# Patient Record
Sex: FEMALE | Race: WHITE | URBAN - METROPOLITAN AREA
[De-identification: names, ages, dates, MRNs, and addresses within clinical notes are randomized per-mention and may not be internally consistent; named-entity substitution may affect disease eponyms.]

---

## 2023-07-18 ENCOUNTER — OFFICE VISIT (OUTPATIENT)
Dept: INTERNAL MEDICINE CLINIC | Facility: CLINIC | Age: 38
End: 2023-07-18
Payer: COMMERCIAL

## 2023-07-18 VITALS
RESPIRATION RATE: 17 BRPM | TEMPERATURE: 99 F | DIASTOLIC BLOOD PRESSURE: 60 MMHG | HEART RATE: 103 BPM | BODY MASS INDEX: 25.64 KG/M2 | SYSTOLIC BLOOD PRESSURE: 110 MMHG | HEIGHT: 58.27 IN | WEIGHT: 123.81 LBS | OXYGEN SATURATION: 98 %

## 2023-07-18 DIAGNOSIS — N63.0 BREAST NODULE: ICD-10-CM

## 2023-07-18 DIAGNOSIS — Z12.4 CERVICAL CANCER SCREENING: ICD-10-CM

## 2023-07-18 DIAGNOSIS — Z00.00 ANNUAL PHYSICAL EXAM: Primary | ICD-10-CM

## 2023-07-18 DIAGNOSIS — Z30.09 COUNSELING FOR INITIATION OF BIRTH CONTROL METHOD: ICD-10-CM

## 2023-07-18 LAB
CONTROL LINE PRESENT WITH A CLEAR BACKGROUND (YES/NO): YES YES/NO
KIT LOT #: NORMAL NUMERIC
PREGNANCY TEST, URINE: NEGATIVE

## 2023-07-18 PROCEDURE — 99385 PREV VISIT NEW AGE 18-39: CPT | Performed by: INTERNAL MEDICINE

## 2023-07-18 PROCEDURE — 3008F BODY MASS INDEX DOCD: CPT | Performed by: INTERNAL MEDICINE

## 2023-07-18 PROCEDURE — 3078F DIAST BP <80 MM HG: CPT | Performed by: INTERNAL MEDICINE

## 2023-07-18 PROCEDURE — 81025 URINE PREGNANCY TEST: CPT | Performed by: INTERNAL MEDICINE

## 2023-07-18 PROCEDURE — 3074F SYST BP LT 130 MM HG: CPT | Performed by: INTERNAL MEDICINE

## 2023-07-18 RX ORDER — NORETHINDRONE ACETATE AND ETHINYL ESTRADIOL 1MG-20(21)
1 KIT ORAL DAILY
Qty: 90 TABLET | Refills: 3 | Status: SHIPPED | OUTPATIENT
Start: 2023-07-18 | End: 2024-07-17

## 2023-08-03 LAB — HPV I/H RISK 1 DNA SPEC QL NAA+PROBE: NEGATIVE

## 2023-09-22 DIAGNOSIS — Z30.09 COUNSELING FOR INITIATION OF BIRTH CONTROL METHOD: ICD-10-CM

## 2023-09-25 RX ORDER — NORETHINDRONE ACETATE AND ETHINYL ESTRADIOL 1MG-20(21)
1 KIT ORAL DAILY
Qty: 90 TABLET | Refills: 3 | Status: SHIPPED | OUTPATIENT
Start: 2023-09-25 | End: 2024-09-24
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
## 2024-01-04 ENCOUNTER — HOSPITAL ENCOUNTER (OUTPATIENT)
Age: 39
Discharge: HOME | End: 2024-01-04
Payer: COMMERCIAL

## 2024-01-04 VITALS
WEIGHT: 124 LBS | DIASTOLIC BLOOD PRESSURE: 82 MMHG | HEIGHT: 58.27 IN | HEART RATE: 95 BPM | RESPIRATION RATE: 20 BRPM | SYSTOLIC BLOOD PRESSURE: 128 MMHG | BODY MASS INDEX: 25.68 KG/M2 | OXYGEN SATURATION: 100 % | TEMPERATURE: 99 F

## 2024-01-04 DIAGNOSIS — J11.1 INFLUENZA: Primary | ICD-10-CM

## 2024-01-04 LAB
POCT INFLUENZA A: POSITIVE
POCT INFLUENZA B: NEGATIVE
S PYO AG THROAT QL IA.RAPID: NEGATIVE
SARS-COV-2 RNA RESP QL NAA+PROBE: NOT DETECTED

## 2024-01-04 PROCEDURE — 99213 OFFICE O/P EST LOW 20 MIN: CPT

## 2024-01-04 PROCEDURE — 87502 INFLUENZA DNA AMP PROBE: CPT | Performed by: EMERGENCY MEDICINE

## 2024-01-04 PROCEDURE — 99204 OFFICE O/P NEW MOD 45 MIN: CPT

## 2024-01-04 PROCEDURE — 87651 STREP A DNA AMP PROBE: CPT | Performed by: PHYSICIAN ASSISTANT

## 2024-01-04 RX ORDER — BENZONATATE 100 MG/1
100 CAPSULE ORAL 3 TIMES DAILY PRN
Qty: 20 CAPSULE | Refills: 0 | Status: SHIPPED | OUTPATIENT
Start: 2024-01-04 | End: 2024-02-03

## 2024-01-04 NOTE — ED INITIAL ASSESSMENT (HPI)
Sore throat, body aches, cough x3 days.     Language line used for translation.  Kushal ID#[id number]

## 2024-01-04 NOTE — ED PROVIDER NOTES
Patient Seen in: [facility]      History     Chief Complaint   Patient presents with    Sore Throat    Cough/URI     Stated Complaint: sore throat    Subjective:   The history is provided by the patient.       38-year-old female with no past medical history presents to the urgent care due to sore throat for the past 3 days.  Associated subjective fevers body aches and chills.  Mild nasal congestion.  Dry cough- No chest pain or shortness of breath.  Patient's been taking ibuprofen over-the-counter with some relief.  No known sick contacts.    Objective:   Past Medical History:   Diagnosis Date    Allergic rhinitis     Rinitis alergica              History reviewed. No pertinent surgical history.             Social History     Socioeconomic History    Marital status: Single   Tobacco Use    Smoking status: Never    Smokeless tobacco: Never   Vaping Use    Vaping Use: Never used   Substance and Sexual Activity    Alcohol use: Yes     Comment: Ocasionally    Drug use: Never   Other Topics Concern    Caffeine Concern No    Exercise No    Seat Belt No    Special Diet No    Stress Concern No    Weight Concern No              Review of Systems   Constitutional:  Positive for chills, fatigue and fever.   HENT:  Positive for congestion and sore throat. Negative for tinnitus, trouble swallowing and voice change.    Respiratory:  Positive for cough. Negative for shortness of breath, wheezing and stridor.    Cardiovascular: Negative.    Gastrointestinal: Negative.        Positive for stated complaint: sore throat  Other systems are as noted in HPI.  Constitutional and vital signs reviewed.      All other systems reviewed and negative except as noted above.    Physical Exam     ED Triage Vitals   BP 01/04/24 1431 128/82   Pulse 01/04/24 1431 95   Resp 01/04/24 1431 20   Temp 01/04/24 1430 99.3 °F (37.4 °C)   Temp src 01/04/24 1430 Temporal   SpO2 01/04/24 1431 100 %   O2 Device 01/04/24 1431 None (Room air)        Current:/82   Pulse 95   Temp 99.3 °F (37.4 °C) (Temporal)   Resp 20   Ht 148 cm (4' 10.27\")   Wt 56.2 kg   LMP 01/02/2024   SpO2 100%   BMI 25.68 kg/m²         Physical Exam  Vitals and nursing note reviewed.   Constitutional:       General: She is not in acute distress.     Appearance: She is well-developed. She is not toxic-appearing.   HENT:      Head: Normocephalic.      Right Ear: Tympanic membrane and ear canal normal.      Left Ear: Tympanic membrane and ear canal normal.      Nose: Congestion present.      Mouth/Throat:      Mouth: Mucous membranes are moist.      Pharynx: Posterior oropharyngeal erythema present. No pharyngeal swelling, oropharyngeal exudate or uvula swelling.      Tonsils: No tonsillar exudate or tonsillar abscesses. 0 on the right. 0 on the left.   Eyes:      Conjunctiva/sclera: Conjunctivae normal.   Cardiovascular:      Rate and Rhythm: Normal rate and regular rhythm.   Pulmonary:      Effort: Pulmonary effort is normal.      Breath sounds: Normal breath sounds.   Musculoskeletal:      Cervical back: Normal range of motion.   Lymphadenopathy:      Cervical: Cervical adenopathy present.   Skin:     General: Skin is warm.   Neurological:      General: No focal deficit present.      Mental Status: She is alert and oriented to person, place, and time.   Psychiatric:         Mood and Affect: Mood normal.         Behavior: Behavior normal.               ED Course     Labs Reviewed   POCT FLU TEST - Abnormal; Notable for the following components:       Result Value    POCT INFLUENZA A Positive (*)     All other components within normal limits    Narrative:     This assay is a rapid molecular in vitro test utilizing nucleic acid amplification of influenza A and B viral RNA.   RAPID SARS-COV-2 BY PCR - Normal   RAPID STREP A - Normal                      MDM     38-year-old female presents to the urgent care due to subjective fevers, body aches, chills, sore throat dry cough  for the past 3 days.  No trismus drooling muffled voice chest pain or shortness of breath.  Denies any abdominal pain nausea or vomiting.  Taking ibuprofen at home with relief.  No known sick contacts.      On exam the patient is afebrile nontoxic.  Copious nasal congestion present.  Mild posterior pharynx erythema.  No concern for peritonsillar abscess.  Lungs clear to auscultation.  Influenza positive COVID rapid strep are negative.  Discussed these findings with the patient.  Continue conservative treatment ibuprofen Mucinex D.  Will rx Tessalon Perles for cough.  Discussed with patient home care strict return precautions importance close follow-up.                           Medical Decision Making  Problems Addressed:  Influenza: acute illness or injury    Amount and/or Complexity of Data Reviewed  Labs: ordered. Decision-making details documented in ED Course.    Risk  OTC drugs.  Prescription drug management.        Disposition and Plan     Clinical Impression:  1. Influenza         Disposition:  Discharge  1/4/2024  3:19 pm    Follow-up:  Tammy Ivy MD  [address] [address]  [phone]                Medications Prescribed:  Discharge Medication List as of 1/4/2024  3:21 PM        START taking these medications    Details   benzonatate 100 MG Oral Cap Take 1 capsule (100 mg total) by mouth 3 (three) times daily as needed for cough., Normal, Disp-20 capsule, R-0

## 2024-01-04 NOTE — DISCHARGE INSTRUCTIONS
Increase fluid and rest  may use Mucinex D as needed  May use Tessalon Perles as needed for cough  ibuProfen every 6 hours as needed for pain  Follow-up with primary care doctor in 48 hours for recheck  reTurn to ER symptoms worsen

## 2024-08-25 DIAGNOSIS — Z30.09 COUNSELING FOR INITIATION OF BIRTH CONTROL METHOD: ICD-10-CM

## 2024-08-26 RX ORDER — NORETHINDRONE ACETATE AND ETHINYL ESTRADIOL 1MG-20(21)
1 KIT ORAL DAILY
Qty: 84 TABLET | Refills: 0 | Status: SHIPPED | OUTPATIENT
Start: 2024-08-26
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
## 2024-11-15 DIAGNOSIS — Z30.09 COUNSELING FOR INITIATION OF BIRTH CONTROL METHOD: ICD-10-CM

## 2024-11-15 RX ORDER — NORETHINDRONE ACETATE AND ETHINYL ESTRADIOL AND FERROUS FUMARATE 1MG-20(21)
KIT ORAL
Qty: 84 TABLET | Refills: 0 | Status: SHIPPED | OUTPATIENT
Start: 2024-11-15

## 2024-11-15 NOTE — TELEPHONE ENCOUNTER
Protocol FAIL    Requesting: Norethin Ace-Eth Estrad-FE (AUROVELA FE 1/20) 1-20 MG-MCG Oral Tab     LOV: 7/18/23  RTC: 1 YEAR   Filled: 8/26/24 84 TABLET 0 REFILL  Recent Labs: 7/18.23    Upcoming OV   No future appointments.

## 2024-11-22 ENCOUNTER — APPOINTMENT (OUTPATIENT)
Dept: GENERAL RADIOLOGY | Age: 39
End: 2024-11-22
Attending: PHYSICIAN ASSISTANT
Payer: COMMERCIAL

## 2024-11-22 ENCOUNTER — HOSPITAL ENCOUNTER (OUTPATIENT)
Age: 39
Discharge: HOME | End: 2024-11-22
Payer: COMMERCIAL

## 2024-11-22 VITALS
OXYGEN SATURATION: 100 % | SYSTOLIC BLOOD PRESSURE: 141 MMHG | WEIGHT: 130 LBS | HEART RATE: 99 BPM | HEIGHT: 55 IN | RESPIRATION RATE: 18 BRPM | BODY MASS INDEX: 30.09 KG/M2 | DIASTOLIC BLOOD PRESSURE: 84 MMHG | TEMPERATURE: 99 F

## 2024-11-22 DIAGNOSIS — N60.11 FIBROCYSTIC DISEASE OF RIGHT BREAST: ICD-10-CM

## 2024-11-22 DIAGNOSIS — R07.89 CHEST WALL PAIN: Primary | ICD-10-CM

## 2024-11-22 PROCEDURE — 93005 ELECTROCARDIOGRAM TRACING: CPT

## 2024-11-22 PROCEDURE — 71046 X-RAY EXAM CHEST 2 VIEWS: CPT | Performed by: PHYSICIAN ASSISTANT

## 2024-11-22 PROCEDURE — 93010 ELECTROCARDIOGRAM REPORT: CPT

## 2024-11-22 PROCEDURE — 99214 OFFICE O/P EST MOD 30 MIN: CPT

## 2024-11-22 NOTE — ED PROVIDER NOTES
Patient Seen in: [facility]      History     Chief Complaint   Patient presents with    Breast Pain     Stated Complaint: Rt Breast pain    Subjective:   HPI  39-year-old well-appearing female presents today for right-sided breast pain that is intermittent in nature for over 1 year.  No reported alleviating/aggravating factors.  No changes with menstrual cycle.  Patient reports intermittent chest pain that she attributes to increased anxiety over reported breast mass.  Patient denies fevers, chills, unintentional weight loss, night sweats, myalgias, nausea, vomiting, diarrhea.    Objective:     Past Medical History:    Allergic rhinitis    Rinitis alergica              History reviewed. No pertinent surgical history.             Social History     Socioeconomic History    Marital status: Single   Tobacco Use    Smoking status: Never    Smokeless tobacco: Never   Vaping Use    Vaping status: Never Used   Substance and Sexual Activity    Alcohol use: Yes     Comment: Ocasionally    Drug use: Never   Other Topics Concern    Caffeine Concern No    Exercise No    Seat Belt No    Special Diet No    Stress Concern No    Weight Concern No              Review of Systems   All other systems reviewed and are negative.      Positive for stated complaint: Rt Breast pain  Other systems are as noted in HPI.  Constitutional and vital signs reviewed.      All other systems reviewed and negative except as noted above.    Physical Exam     ED Triage Vitals [11/22/24 1721]   /84   Pulse 99   Resp 18   Temp 98.6 °F (37 °C)   Temp src Oral   SpO2 100 %   O2 Device None (Room air)       Current Vitals:   Vital Signs  BP: 141/84  Pulse: 99  Resp: 18  Temp: 98.6 °F (37 °C)  Temp src: Oral    Oxygen Therapy  SpO2: 100 %  O2 Device: None (Room air)        Physical Exam  Vitals and nursing note reviewed.   Constitutional:       General: She is not in acute distress.     Appearance: Normal appearance. She is normal  weight. She is not ill-appearing, toxic-appearing or diaphoretic.   HENT:      Head: Normocephalic and atraumatic.      Right Ear: External ear normal.      Left Ear: External ear normal.      Nose: Nose normal.   Eyes:      Extraocular Movements: Extraocular movements intact.      Conjunctiva/sclera: Conjunctivae normal.      Pupils: Pupils are equal, round, and reactive to light.   Pulmonary:      Effort: Pulmonary effort is normal. No respiratory distress.      Comments: Right sided reproducible chest tenderness Otherwise no crepitus, flail segment, paradoxical motion    Chest:      Chest wall: Tenderness present.       Musculoskeletal:         General: No swelling, tenderness, deformity or signs of injury. Normal range of motion.      Cervical back: Normal range of motion and neck supple.   Skin:     General: Skin is warm and dry.      Capillary Refill: Capillary refill takes less than 2 seconds.      Coloration: Skin is not jaundiced or pale.      Findings: No bruising, erythema, lesion or rash.   Neurological:      General: No focal deficit present.      Mental Status: She is alert and oriented to person, place, and time. Mental status is at baseline.      Gait: Gait normal.   Psychiatric:         Mood and Affect: Mood normal.         Behavior: Behavior normal.             ED Course   Labs Reviewed - No data to display  EKG    Rate, intervals and axes as noted on EKG Report.  Rate: 85 bpm  Rhythm: Sinus Rhythm  Reading: No ST segment elevation/ depression.  NO QRS widening     XR CHEST PA + LAT CHEST (CPT=71046)   Final Result   PROCEDURE:  XR CHEST PA + LAT CHEST (CPT=71046)       INDICATIONS:  chest wall pain       COMPARISON:  None.       TECHNIQUE:  PA and lateral chest radiographs were obtained.       PATIENT STATED HISTORY: (As transcribed by Technologist)  Patient states    that she has anterior and medial chest pain for a few days.            FINDINGS:     LUNGS:  No focal consolidation.  Normal  vascularity.   CARDIAC:  Normal size cardiac silhouette.   MEDIASTINUM:  Normal.   PLEURA:  Normal.  No pleural effusions.   BONES:  Normal for age.                         =====   CONCLUSION:  No acute process           LOCATION:  [id number]           Dictated by (CST): Yosvany Arroyo MD on 11/22/2024 at 7:13 PM        Finalized by (CST): Yosvany Arroyo MD on 11/22/2024 at 7:14 PM           Medications - No data to display                    MDM             Medical Decision Making  39-year-old female presents with acute on chronic right-sided breast swelling likely due to fibrocystic breast changes with unrelated right-sided anterior chest wall discomfort.  Considerations include but not limited to breast mass versus breast cancer versus costochondritis versus AMI versus pneumonia  Plan  -Chest x-ray PA/lateral  -EKG  -Discharge to home  -Refer to OB/GYN for further evaluation  -Return to ED IC or ED if symptoms worsen    Amount and/or Complexity of Data Reviewed  Labs: ordered. Decision-making details documented in ED Course.  Radiology: ordered and independent interpretation performed.     Details: No consolidation or cardiopulmonary abnormality         Disposition and Plan     Clinical Impression:  1. Chest wall pain    2. Fibrocystic disease of right breast         Disposition:  There is no disposition on file for this visit.  There is no disposition time on file for this visit.    Follow-up:  Tammy Ivy MD  [address]  [facility] [address]  [facility] [address]  [phone]          [facility]  [address]  [facility] [address]  [phone]        Dora Auguste MD  [facility] [address]  [place] [address]  [phone]                Medications Prescribed:  Current Discharge Medication List              Supplementary Documentation:

## 2024-11-23 LAB
ATRIAL RATE: 85 BPM
P AXIS: 58 DEGREES
P-R INTERVAL: 120 MS
Q-T INTERVAL: 344 MS
QRS DURATION: 78 MS
QTC CALCULATION (BEZET): 409 MS
R AXIS: 71 DEGREES
T AXIS: 44 DEGREES
VENTRICULAR RATE: 85 BPM

## 2025-02-22 DIAGNOSIS — Z30.09 COUNSELING FOR INITIATION OF BIRTH CONTROL METHOD: ICD-10-CM

## 2025-02-24 RX ORDER — NORETHINDRONE ACETATE AND ETHINYL ESTRADIOL 1MG-20(24)
KIT ORAL
Qty: 84 TABLET | Refills: 0 | OUTPATIENT
Start: 2025-02-24

## 2025-02-24 NOTE — TELEPHONE ENCOUNTER
LOV: 7/18/2023 with Dr. Aldridge  RTC: no follow-up on file  Last Relevant Labs: no blood work on file  Filled: 11/15/2024    #84 with 0 refills    No future appointments.

## 2025-02-26 NOTE — TELEPHONE ENCOUNTER
Left pt detailed message asking if DVJACQUES is still her PCP as she has not been seen in over two years. If pt would like to continue care at [facility] she has to schedule an OV.

## 2025-02-26 NOTE — TELEPHONE ENCOUNTER
Please confirm if patient is still seeing Dr. Aldridge if so schedule=le her for an appointment, been over 2 years since she was seen
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
## 2025-03-24 DIAGNOSIS — Z30.09 COUNSELING FOR INITIATION OF BIRTH CONTROL METHOD: ICD-10-CM

## 2025-03-25 RX ORDER — NORETHINDRONE ACETATE AND ETHINYL ESTRADIOL AND FERROUS FUMARATE 1MG-20(21)
KIT ORAL
Qty: 84 TABLET | Refills: 0 | OUTPATIENT
Start: 2025-03-25
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
## 2025-06-05 DIAGNOSIS — Z30.09 COUNSELING FOR INITIATION OF BIRTH CONTROL METHOD: ICD-10-CM

## 2025-06-05 RX ORDER — NORETHINDRONE ACETATE AND ETHINYL ESTRADIOL 1MG-20(21)
KIT ORAL
Qty: 84 TABLET | Refills: 0 | Status: CANCELLED | OUTPATIENT
Start: 2025-06-05

## 2025-07-07 ENCOUNTER — OFFICE VISIT (OUTPATIENT)
Dept: INTERNAL MEDICINE CLINIC | Facility: CLINIC | Age: 40
End: 2025-07-07
Payer: COMMERCIAL

## 2025-07-07 VITALS
SYSTOLIC BLOOD PRESSURE: 120 MMHG | OXYGEN SATURATION: 98 % | HEART RATE: 85 BPM | RESPIRATION RATE: 18 BRPM | BODY MASS INDEX: 28.13 KG/M2 | WEIGHT: 134 LBS | HEIGHT: 58 IN | TEMPERATURE: 97 F | DIASTOLIC BLOOD PRESSURE: 68 MMHG

## 2025-07-07 DIAGNOSIS — R92.30 DENSE BREAST: ICD-10-CM

## 2025-07-07 DIAGNOSIS — N60.01 CYST OF RIGHT BREAST: ICD-10-CM

## 2025-07-07 DIAGNOSIS — Z00.00 ANNUAL PHYSICAL EXAM: Primary | ICD-10-CM

## 2025-07-07 DIAGNOSIS — R53.83 OTHER FATIGUE: ICD-10-CM

## 2025-07-07 DIAGNOSIS — Z30.09 COUNSELING FOR INITIATION OF BIRTH CONTROL METHOD: ICD-10-CM

## 2025-07-07 DIAGNOSIS — Z01.89 ROUTINE LAB DRAW: ICD-10-CM

## 2025-07-07 RX ORDER — NORETHINDRONE ACETATE AND ETHINYL ESTRADIOL 1MG-20(21)
1 KIT ORAL DAILY
Qty: 84 TABLET | Refills: 3 | Status: SHIPPED | OUTPATIENT
Start: 2025-07-07

## 2025-07-07 NOTE — PROGRESS NOTES
Subjective:   Lia Bentley is a 39 year old female  who presents for Breast Problem (Mass problem in right breast)       The following individual(s) verbally consented to be recorded using ambient AI listening technology and understand that they can each withdraw their consent to this listening technology at any point by asking the clinician to turn off or pause the recording:    Patient name: Lia Bentley  1/24/25 has BL mammogram and US in [place]         History of Present Illness  Lia Bentley is a 39 year old female who presents for a preventive exam and follow-up on breast imaging studies.    She underwent a mammogram and ultrasound in [place] due to dense breast tissue in 1/2025 that showed R breast cyst with recs for follow-up in 6 months.     Her father has diabetes and hypertension, prompting her concern about her glucose levels, which she monitors regularly. She experiences fatigue and low energy at times.    She has irregular menstrual periods, managed with oral contraceptive pills, but would like to go back to prior OCPs. Denies contraindications. Has not missed does.     Remainder of ROS negative.     History/Other:    Chief Complaint Reviewed and Verified  Nursing Notes Reviewed and   Verified  Allergies Reviewed  Medications Reviewed  OB Status Reviewed           Current Medications[1]    Review of Systems:  Pertinent items are noted in HPI.  A comprehensive 10 point review of systems was completed.  Pertinent positives and negatives noted in the the HPI.        Objective:   /68   Pulse 85   Temp 97.4 °F (36.3 °C) (Temporal)   Resp 18   Ht 4' 10\" (1.473 m)   Wt 134 lb (60.8 kg)   LMP 07/01/2025 (Exact Date)   SpO2 98%   BMI 28.01 kg/m²  Estimated body mass index is 28.01 kg/m² as calculated from the following:    Height as of this encounter: 4' 10\" (1.473 m).    Weight as of this encounter: 134 lb (60.8 kg).  PHYSICAL EXAM:   General: no acute distress   Eyes: pupils equal and  reactive; EOMI; sclera normal; conjunctiva normal   ENT:sinuses non-tender on palpation; without erythema or exudate  Neck: trachea midline; no adenopathy; thyroid not enlarged   Hematologic/lymphatic:no cervical lymphadenopathy; no supraclavicular adenopathy   Respiratory: no increased work of breathing; good air exchange; CTAB; no crackles or wheezing   Cardiovascular: RRR; S1, S2; no murmurs; no edema   Gastrointestinal: normal bowel sounds; soft; non-distended; non-tender  Neurological: awake, alert, oriented x3; CNII-XII grossly intact;  MSK: full ROM; strength 5/5  Behavioral/Psych: euthymic; appropriate affect   Breasts: symmetrical; no  nipple discharge or rashes/lesions noted; however R mass noted- per hx cyst that was previously noted, but due for follow-up   : deferred     Physical Exam      Assessment & Plan:     Assessment & Plan  Breast cyst/mass  -as above completed mammogram and US in 1/2025 in [place] with recs to repeat in 6 months. Per report possible cyst   - Order breast ultrasound for follow-up.  - Order diagnostic mammogram  - pt to bring previous imaging studies to appointments for comparison.    Menstrual Irregularities   - Prescribe Aurovela oral contraceptive.  - Advise using protection for at least one week when switching contraceptives.    Hypertriglyceridemia  Elevated triglycerides with controlled cholesterol. Dietary changes recommended.  - Advise dietary modifications to lower triglycerides.    Fatigue  - Order blood tests including vitamin D, iron, glucose, and A1c levels.          This note was created utilizing Tamtron speech recognition software which may lead to grammatical errors/typos.   If any word or phrase is confusing, it is likely due to recognition error. Please ask the provider for clarification.      Tammy Arcos MD         [1]   Current Outpatient Medications   Medication Sig Dispense Refill    AUROVELA FE 1/20 1-20 MG-MCG Oral Tab TAKE 1 TABLET BY MOUTH DAILY.  DUE FOR FOLLOW-UP (Patient not taking: Reported on 7/7/2025) 84 tablet 0

## (undated) NOTE — LETTER
Date & Time: 1/4/2024, 3:19 PM  Patient: Lia Bentley  Encounter Provider(s):    Gabriela Degroot PA       To Whom It May Concern:    Lia Bentley was seen and treated in our department on 1/4/2024. She should not return to work until 01/08/24 .    If you have any questions or concerns, please do not hesitate to call.        _____________________________  Physician/APC Signature